# Patient Record
Sex: FEMALE | Race: WHITE | NOT HISPANIC OR LATINO | URBAN - METROPOLITAN AREA
[De-identification: names, ages, dates, MRNs, and addresses within clinical notes are randomized per-mention and may not be internally consistent; named-entity substitution may affect disease eponyms.]

---

## 2019-05-29 ENCOUNTER — EMERGENCY (EMERGENCY)
Facility: HOSPITAL | Age: 59
LOS: 1 days | Discharge: HOME | End: 2019-05-29
Attending: EMERGENCY MEDICINE | Admitting: EMERGENCY MEDICINE
Payer: COMMERCIAL

## 2019-05-29 VITALS
DIASTOLIC BLOOD PRESSURE: 110 MMHG | RESPIRATION RATE: 18 BRPM | OXYGEN SATURATION: 100 % | HEART RATE: 104 BPM | TEMPERATURE: 98 F | SYSTOLIC BLOOD PRESSURE: 168 MMHG

## 2019-05-29 VITALS
TEMPERATURE: 98 F | RESPIRATION RATE: 18 BRPM | OXYGEN SATURATION: 100 % | HEART RATE: 92 BPM | DIASTOLIC BLOOD PRESSURE: 98 MMHG | SYSTOLIC BLOOD PRESSURE: 148 MMHG

## 2019-05-29 DIAGNOSIS — F10.120 ALCOHOL ABUSE WITH INTOXICATION, UNCOMPLICATED: ICD-10-CM

## 2019-05-29 DIAGNOSIS — R41.82 ALTERED MENTAL STATUS, UNSPECIFIED: ICD-10-CM

## 2019-05-29 PROCEDURE — 99284 EMERGENCY DEPT VISIT MOD MDM: CPT

## 2019-05-29 NOTE — ED ADULT NURSE NOTE - CHPI ED NUR SYMPTOMS NEG
no abdominal pain/no chills/no fever/no disorientation/no weakness/no nausea/no vomiting/no pain/no abdominal distension

## 2019-05-29 NOTE — ED ADULT TRIAGE NOTE - CHIEF COMPLAINT QUOTE
patient awake and alert found at [facility] with bottle of champagne. patient admits to drinking alcohol , states she also is HTN and DM. did not take HTN medication for a few days.

## 2019-05-29 NOTE — ED ADULT NURSE NOTE - OBJECTIVE STATEMENT
58 y/o female BIBA c/o AMS. Patient admits to drinking wine tonight, slurred speech on arrival. Per EMS, was found at [place] with bottle of champagne in hand. Denies falls, head trauma, neck or back pain, N/V.

## 2019-05-29 NOTE — ED ADULT NURSE NOTE - CHIEF COMPLAINT QUOTE
patient awake and alert found at [facility] with bottle of champagne. patient admits to drinking alcohol , states she also is HTN and DM. did not take HTN medication for a few days.

## 2019-05-29 NOTE — ED PROVIDER NOTE - CLINICAL SUMMARY MEDICAL DECISION MAKING FREE TEXT BOX
d/c home pending clinical sobriety. low threshold to obtain labs and imaging should pt develop any medical complaints.

## 2019-05-29 NOTE — ED PROVIDER NOTE - PHYSICAL EXAMINATION
VITAL SIGNS: I have reviewed nursing notes and confirm.  CONSTITUTIONAL: Well-developed; well-nourished smelling of alcohol sitting up in stretcher speaking clearly in complete sentences, A&Ox3; in no acute distress.  SKIN: Agree with RN documentation regarding decubitus evaluation. Remainder of skin exam is warm and dry, no acute rash.  HEAD: Normocephalic; atraumatic.  EYES: PERRL, EOM intact; conjunctiva and sclera clear.  ENT: No nasal discharge; airway clear.  NECK: Supple; non tender.  CARD: RRR  RESP: CTA  ABD: NTND  EXT: Normal ROM. No clubbing, cyanosis or edema.  LYMPH: No acute cervical adenopathy.  NEURO: Alert, oriented. Grossly unremarkable.

## 2019-05-29 NOTE — ED PROVIDER NOTE - OBJECTIVE STATEMENT
58 y/o F w/hx HTN, NIDDM, BIBEMS for public intoxication admits to drinking alcohol, denies trauma or pain of any kind. Vomited 1x nbnb emesis. Denies active nausea. Requesting discharge at time of interview.

## 2019-05-29 NOTE — ED ADULT NURSE NOTE - NSIMPLEMENTINTERV_GEN_ALL_ED
Implemented All Fall Risk Interventions:  [place] to call system. Call bell, personal items and telephone within reach. Instruct patient to call for assistance. Room bathroom lighting operational. Non-slip footwear when patient is off stretcher. Physically safe environment: no spills, clutter or unnecessary equipment. Stretcher in lowest position, wheels locked, appropriate side rails in place. Provide visual cue, wrist band, yellow gown, etc. Monitor gait and stability. Monitor for mental status changes and reorient to person, place, and time. Review medications for side effects contributing to fall risk. Reinforce activity limits and safety measures with patient and family.

## 2019-05-29 NOTE — ED PROVIDER NOTE - CARE PLAN
ASSESSMENT/PLAN     (I12.9) Benign hypertensive kidney disease with chronic kidney disease, stage 1-4 or unspecified chronic kidney disease  (primary encounter diagnosis)  Comment: Blood pressure is stable and well-controlled. Current creatinine is at a high for the year.  Plan: CBC & AUTO DIFFERENTIAL, CANCELED: CBC & AUTO         DIFFERENTIAL        Continue atenolol spironolactone hydrochlorothiazide and lisinopril, recheck lab next visit.    (Z23) Need for vaccination  Comment: Due for flu shot  Plan: INFLUENZA QUADRIVALENT SPLIT PRES FREE 0.5 ML         VACC, IM (FLULAVAL,FLUARIX,FLUZONE)            (J41.0) Simple chronic bronchitis (CMS/HCC)  Comment: Clinically stable and well-controlled  Plan: Recommend stop smoking.    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  
Principal Discharge DX:	Alcoholic intoxication without complication

## 2024-03-11 NOTE — ED ADULT NURSE NOTE - PRIMARY CARE PROVIDER
unk
Please discontinue Wegovy use as it may be causing an adverse reaction and causing you to feel nauseated.  Should he develop any abdominal pain right upper quadrant or epigastric associated with worsening nausea and vomiting please return to the nearest emergency department for reevaluation.  Otherwise keep bland diet for the next 2 to 3 days and follow-up with your PCP for reassessment and reevaluation of your need for Wegovy.
